# Patient Record
Sex: FEMALE | Race: WHITE | URBAN - METROPOLITAN AREA
[De-identification: names, ages, dates, MRNs, and addresses within clinical notes are randomized per-mention and may not be internally consistent; named-entity substitution may affect disease eponyms.]

---

## 2017-08-15 ENCOUNTER — HISTORICAL (OUTPATIENT)
Dept: LAB | Facility: HOSPITAL | Age: 37
End: 2017-08-15

## 2017-08-15 ENCOUNTER — HISTORICAL (OUTPATIENT)
Dept: PREADMISSION TESTING | Facility: HOSPITAL | Age: 37
End: 2017-08-15

## 2017-08-15 LAB
ABS NEUT (OLG): 3.61 X10(3)/MCL (ref 2.1–9.2)
ALBUMIN SERPL-MCNC: 4 GM/DL (ref 3.4–5)
ALBUMIN/GLOB SERPL: 1.1 RATIO (ref 1.1–2)
ALP SERPL-CCNC: 122 UNIT/L (ref 38–126)
ALT SERPL-CCNC: 29 UNIT/L (ref 12–78)
APTT PPP: 31.1 SECOND(S) (ref 20.6–36)
AST SERPL-CCNC: 17 UNIT/L (ref 15–37)
BASOPHILS # BLD AUTO: 0 X10(3)/MCL (ref 0–0.2)
BASOPHILS NFR BLD AUTO: 1 %
BILIRUB SERPL-MCNC: 0.5 MG/DL (ref 0.2–1)
BILIRUBIN DIRECT+TOT PNL SERPL-MCNC: 0.1 MG/DL (ref 0–0.5)
BILIRUBIN DIRECT+TOT PNL SERPL-MCNC: 0.4 MG/DL (ref 0–0.8)
BUN SERPL-MCNC: 11 MG/DL (ref 7–18)
CALCIUM SERPL-MCNC: 9.3 MG/DL (ref 8.5–10.1)
CHLORIDE SERPL-SCNC: 104 MMOL/L (ref 98–107)
CO2 SERPL-SCNC: 29 MMOL/L (ref 21–32)
CREAT SERPL-MCNC: 0.73 MG/DL (ref 0.55–1.02)
EOSINOPHIL # BLD AUTO: 0.1 X10(3)/MCL (ref 0–0.9)
EOSINOPHIL NFR BLD AUTO: 2 %
ERYTHROCYTE [DISTWIDTH] IN BLOOD BY AUTOMATED COUNT: 13.2 % (ref 11.5–17)
GLOBULIN SER-MCNC: 3.6 GM/DL (ref 2.4–3.5)
GLUCOSE SERPL-MCNC: 93 MG/DL (ref 74–106)
HCT VFR BLD AUTO: 46 % (ref 37–47)
HGB BLD-MCNC: 14.4 GM/DL (ref 12–16)
INR PPP: 1.04 (ref 0–1.27)
LYMPHOCYTES # BLD AUTO: 2.9 X10(3)/MCL (ref 0.6–4.6)
LYMPHOCYTES NFR BLD AUTO: 40 %
MCH RBC QN AUTO: 27.3 PG (ref 27–31)
MCHC RBC AUTO-ENTMCNC: 31.3 GM/DL (ref 33–36)
MCV RBC AUTO: 87.1 FL (ref 80–94)
MONOCYTES # BLD AUTO: 0.6 X10(3)/MCL (ref 0.1–1.3)
MONOCYTES NFR BLD AUTO: 8 %
NEUTROPHILS # BLD AUTO: 3.61 X10(3)/MCL (ref 2.1–9.2)
NEUTROPHILS NFR BLD AUTO: 50 %
PLATELET # BLD AUTO: 240 X10(3)/MCL (ref 130–400)
PMV BLD AUTO: 11.2 FL (ref 9.4–12.4)
POTASSIUM SERPL-SCNC: 4.6 MMOL/L (ref 3.5–5.1)
PROT SERPL-MCNC: 7.6 GM/DL (ref 6.4–8.2)
PROTHROMBIN TIME: 13.4 SECOND(S) (ref 12.1–14.2)
RBC # BLD AUTO: 5.28 X10(6)/MCL (ref 4.2–5.4)
SODIUM SERPL-SCNC: 141 MMOL/L (ref 136–145)
WBC # SPEC AUTO: 7.2 X10(3)/MCL (ref 4.5–11.5)

## 2017-08-18 ENCOUNTER — HISTORICAL (OUTPATIENT)
Dept: SURGERY | Facility: HOSPITAL | Age: 37
End: 2017-08-18

## 2018-06-15 ENCOUNTER — HISTORICAL (OUTPATIENT)
Dept: LAB | Facility: HOSPITAL | Age: 38
End: 2018-06-15

## 2018-06-15 ENCOUNTER — HISTORICAL (OUTPATIENT)
Dept: BARIATRICS | Facility: HOSPITAL | Age: 38
End: 2018-06-15

## 2018-06-15 ENCOUNTER — HISTORICAL (OUTPATIENT)
Dept: PREADMISSION TESTING | Facility: HOSPITAL | Age: 38
End: 2018-06-15

## 2018-06-15 LAB — H PYLORI AB SER IA-ACNC: NEGATIVE

## 2018-07-06 ENCOUNTER — HISTORICAL (OUTPATIENT)
Dept: SURGERY | Facility: HOSPITAL | Age: 38
End: 2018-07-06

## 2018-08-14 ENCOUNTER — HISTORICAL (OUTPATIENT)
Dept: BARIATRICS | Facility: HOSPITAL | Age: 38
End: 2018-08-14

## 2018-08-24 ENCOUNTER — HISTORICAL (OUTPATIENT)
Dept: BARIATRICS | Facility: HOSPITAL | Age: 38
End: 2018-08-24

## 2018-09-21 ENCOUNTER — HISTORICAL (OUTPATIENT)
Dept: BARIATRICS | Facility: HOSPITAL | Age: 38
End: 2018-09-21

## 2018-11-05 ENCOUNTER — HISTORICAL (OUTPATIENT)
Dept: BARIATRICS | Facility: HOSPITAL | Age: 38
End: 2018-11-05

## 2019-01-10 ENCOUNTER — HISTORICAL (OUTPATIENT)
Dept: BARIATRICS | Facility: HOSPITAL | Age: 39
End: 2019-01-10

## 2021-06-18 ENCOUNTER — HISTORICAL (OUTPATIENT)
Dept: ADMINISTRATIVE | Facility: HOSPITAL | Age: 41
End: 2021-06-18

## 2021-12-03 ENCOUNTER — HISTORICAL (OUTPATIENT)
Dept: SURGERY | Facility: HOSPITAL | Age: 41
End: 2021-12-03

## 2021-12-03 ENCOUNTER — HOSPITAL ENCOUNTER (OUTPATIENT)
Dept: SURGERY | Facility: HOSPITAL | Age: 41
End: 2021-12-04
Attending: SURGERY | Admitting: SURGERY

## 2022-04-30 NOTE — OP NOTE
DATE OF SURGERY:    08/18/2017    SURGEON:  Doe Mcdonough MD    PREOPERATIVE DIAGNOSIS:  Obstructive sleep apnea, tonsillar hypertrophy.    POSTOPERATIVE DIAGNOSIS:  Obstructive sleep apnea, tonsillar hypertrophy.    PROCEDURE:  Tonsillectomy.    BRIEF HISTORY:  This is a 37-year-old female with a history of severe obstructive sleep apnea and tonsillar hypertrophy.  Through discussion were had with Liz regarding tonsillectomy with uvulopalatopharyngoplasty, however she wished not to do the uvulopalatopharyngoplasty and wanted to only proceed with tonsillectomy.  We had thorough discussions about the risks, benefits and alternatives including bleeding, infection, dehydration, pain, return to surgery and others.  The patient wished to proceed.  Due to her BMI  it was felt best to do this where she could possibly be kept for observation if need be.  After a discussion with the patient regarding these factors and others, consents were signed.    PROCEDURE IN DETAIL:  The patient was brought to the operating room and placed supine position.  After achieving general endotracheal anesthesia, a shoulder roll was placed between the scapula to aid in extension of the neck. The patient was draped for tonsillectomy.  With the use of Dean Yolanda mouth gag, the oropharynx was exposed.  There was very little room in the oral cavity and oral pharynx and with a large tongue present as well, it was decided that we should sweep the tongue to one side, re-suspend and address the surgery this way to get a better view of the oral pharynx.  With use of red rubber catheter, it was placed through the nose and suspended with hemostat tacked to a soft palate retractor.  There was some difficulty in getting the tooth guards of the Dean-Yolanda mouth gag to attach to her teeth completely, however they were protected by the 4 x 4, the gums were protected by a 4 x 4, which was moistened.  Using an Allis clamp the right tonsil was  grasped, it appeared to be significantly larger than the right and extended higher into the soft palate.  With the use of the PEAK blade, the tonsil was snared as well as the suction cautery.  The tonsil was dissected from the fossa with care being taken to stay in the correct plane.  The tonsil was taken out from superior pole down to the inferior pole.  Hemostasis was achieved with suction cautery.  Once this tonsil was dry, we then released all instrumentations, swept the tongue to the opposite side and addressed the left tonsil using the same technique.  After completion of the tonsillectomy, the red rubber catheter was released.  Valsalva maneuver maneuvers were done which showed no evidence of bleeding.  Copious irrigation was done as well.  Once the patient was dry, instrumentation was then     withdrawn and the patient awakened and extubated in the operating room and brought to recovery in stable condition.        ______________________________  MD ARVIND Ruiz/YOSI  DD:  08/21/2017  Time:  10:56AM  DT:  08/21/2017  Time:  12:45PM  Job #:  [id number]

## 2022-04-30 NOTE — OP NOTE
Patient:   Liz Mcfarladn            MRN: [id number]            FIN: [id number]               Age:   38 years     Sex:  Female     :  1980   Associated Diagnoses:   GERD - Gastro-esophageal reflux disease; Morbid obesity   Author:   Claudio Dawn MD      Operative Note   Operative Information   Date/ Time:  2018 07:08:00.     Procedures Performed: esophagogastroduodenoscopy.     Indications: 38f is undergoing preoperative workup for planned lap vertical sleeve gastrectomy for morbid obesity  reports  GERD symptoms, need to rule out hiatal hernia or ulcer.     Preoperative Diagnosis: GERD - Gastro-esophageal reflux disease (XFG82-CN K21.9), Morbid obesity (FXP76-CH E66.01).     Postoperative Diagnosis: GERD - Gastro-esophageal reflux disease (ULC40-WG K21.9), Morbid obesity (HYS99-ZE E66.01).     Surgeon: Claudio Dawn MD.     Anesthesia: mac.     Speciman Removed: none.     Description of Procedure/Findings/    Complications: pt laid in left lateral decubitus position and monitored sedation administered by anesthesia    enteroscope advance into patients mouth through bite block    esohagus normal appearing,    stomach appeared normal, scope then advaced in duodenum, duodenum normal appearing mucosa,    the stomach was inflated and retroflexed view revealed no hiatal hernia, hiatus normal,    the air was suctioned out and the scope was withdrawn    pt taken to pacu in stable satisfactory condition..     Esimated blood loss: No blood loss.     Findings: normal mucosa  no hiatal hernia.     Complications: None.

## 2022-05-04 NOTE — HISTORICAL [facility] CERNER
This is a historical note converted from Shelly. Formatting and pictures may have been removed.  Please reference Shelly for original formatting and attached multimedia. Indication for Surgery  This is a 41-year-old female that had a massive weight loss.??She presented to me for panniculectomy and brachioplasty evaluation.??Options were discussed with her and she elected to proceed?with bilateral brachioplasty with a lateral thoracoplasty?using an L-shaped incision?and a doljt-jt-tyz panniculectomy.??The risks of surgery were discussed in detail including wound healing complications tissue necrosis?nerve injury, lymphatic damage,?scarring?and loss of function.??DVT and PE were also discussed in detail.??She signed the informed consent  Preoperative Diagnosis  Large pannus, unsatisfactory appearance of the arms and lateral thorax  Postoperative Diagnosis  Same  Operation  1.??Rmfon-kb-fcu panniculectomy  2.??Bilateral brachioplasty with lateral thoracoplasty  Surgeon(s)  Ceasar Patel  Assistant  None  Anesthesia  General  Complications  None  Technique  Patient was identified in the preoperative holding area.?   Informed consent was reviewed with her. She was marked for bilateral?L-shaped brachioplastys and lateral thoracoplasty?and a ibvov-jd-fzc panniculectomy. She was seen in the operating room placed supine position general tracheal anesthesia was provided. Prepped and draped in the?standard surgical fashion. Timeout was taken and was agreement.  ?  ?  She started with the patients right arm.?Her markings were confirmed. Skin was incised with 10 blade scalpel. Skin and subcutaneous tissue was removed.?Hemostasis was ensured with electrocautery.?A 15 round Alan drain was placed within the wound that exited inferiorly secured to skin with 3-0 silk.?Skin flap was anchored?at the dome of the axilla to the?deltopectoral fascia using 0 Vicryl suture. Skin and subcutaneous tissues tissue was approximately 0 Vicryl  sutures and deep dermis.?2 a subcuticular?stratafix the skin. Dermabond tape was used to dressing.  ?  Attention was then paid to the patients left arm.?Skin markings were incised with 10 blade scalpel. A large cutter was used to remove the skin and subcutaneous tissue.?Skin flap was anchored to the deltopectoral abdomen axilla with 0 Vicryl sutures.?Deep dermis of the cc tissue is present with 0 Vicryl sutures in interrupted fashion. Subcutaneous ablatorand the wound bed prior to closure secured to skin with 3-0 silk suture inferiorly.? Skin was closed with 2-0 stratafix  ?  ?  We then performed a txalb-nx-xcy panniculectomy.?Markings are confirmed. Skin was incised a 10 blade scalpel further dissection was achieved with electrocautery.?The umbilicus is maintained on its stalk.?We removed the entire skin and subcutaneous tissue above the abdominal wall fascia.?The wound was then closed?in layers.?2, 15 round Alan drains were placed prior to closure exited laterally scheduled skin 3-0 silk.? Superficial fascial system and deep dermis were closed with?0 Vicryl sutures in interrupted fashion.?Umbilicus was also inset in the same fashion.?2 subtalar strategies was then used on the skin?area and Dermabond tape was used to dressing.  ?  All counts correct.? No complications

## 2024-10-11 ENCOUNTER — HOSPITAL ENCOUNTER (EMERGENCY)
Facility: HOSPITAL | Age: 44
Discharge: HOME | End: 2024-10-11
Attending: EMERGENCY MEDICINE
Payer: COMMERCIAL

## 2024-10-11 VITALS
SYSTOLIC BLOOD PRESSURE: 127 MMHG | TEMPERATURE: 98 F | RESPIRATION RATE: 18 BRPM | WEIGHT: 130 LBS | OXYGEN SATURATION: 97 % | DIASTOLIC BLOOD PRESSURE: 74 MMHG | HEART RATE: 86 BPM

## 2024-10-11 DIAGNOSIS — N39.0 URINARY TRACT INFECTION WITHOUT HEMATURIA, SITE UNSPECIFIED: ICD-10-CM

## 2024-10-11 DIAGNOSIS — F41.9 ANXIETY: Primary | ICD-10-CM

## 2024-10-11 LAB
ALBUMIN SERPL BCP-MCNC: 4.7 G/DL (ref 3.5–5.2)
ALP SERPL-CCNC: 78 U/L (ref 55–135)
ALT SERPL W/O P-5'-P-CCNC: 9 U/L (ref 10–44)
ANION GAP SERPL CALC-SCNC: 17 MMOL/L (ref 8–16)
AST SERPL-CCNC: 14 U/L (ref 10–40)
B-HCG UR QL: NEGATIVE
BACTERIA #/AREA URNS HPF: ABNORMAL /HPF
BASOPHILS # BLD AUTO: 0.06 K/UL (ref 0–0.2)
BASOPHILS NFR BLD: 0.6 % (ref 0–1.9)
BILIRUB SERPL-MCNC: 0.8 MG/DL (ref 0.1–1)
BILIRUB UR QL STRIP: NEGATIVE
BUN SERPL-MCNC: 11 MG/DL (ref 6–20)
CALCIUM SERPL-MCNC: 10.4 MG/DL (ref 8.7–10.5)
CHLORIDE SERPL-SCNC: 107 MMOL/L (ref 95–110)
CLARITY UR: ABNORMAL
CO2 SERPL-SCNC: 23 MMOL/L (ref 23–29)
COLOR UR: YELLOW
CREAT SERPL-MCNC: 1.4 MG/DL (ref 0.5–1.4)
DIFFERENTIAL METHOD BLD: NORMAL
EOSINOPHIL # BLD AUTO: 0.2 K/UL (ref 0–0.5)
EOSINOPHIL NFR BLD: 1.4 % (ref 0–8)
ERYTHROCYTE [DISTWIDTH] IN BLOOD BY AUTOMATED COUNT: 12.9 % (ref 11.5–14.5)
EST. GFR  (NO RACE VARIABLE): 47 ML/MIN/1.73 M^2
GLUCOSE SERPL-MCNC: 99 MG/DL (ref 70–110)
GLUCOSE UR QL STRIP: NEGATIVE
GRAN CASTS #/AREA URNS LPF: 10 /LPF
HCT VFR BLD AUTO: 43.8 % (ref 37–48.5)
HGB BLD-MCNC: 14.6 G/DL (ref 12–16)
HGB UR QL STRIP: NEGATIVE
HYALINE CASTS #/AREA URNS LPF: 18 /LPF
IMM GRANULOCYTES # BLD AUTO: 0.03 K/UL (ref 0–0.04)
IMM GRANULOCYTES NFR BLD AUTO: 0.3 % (ref 0–0.5)
KETONES UR QL STRIP: ABNORMAL
LEUKOCYTE ESTERASE UR QL STRIP: NEGATIVE
LYMPHOCYTES # BLD AUTO: 2.8 K/UL (ref 1–4.8)
LYMPHOCYTES NFR BLD: 25.8 % (ref 18–48)
MCH RBC QN AUTO: 29.1 PG (ref 27–31)
MCHC RBC AUTO-ENTMCNC: 33.3 G/DL (ref 32–36)
MCV RBC AUTO: 87 FL (ref 82–98)
MICROSCOPIC COMMENT: ABNORMAL
MONOCYTES # BLD AUTO: 0.8 K/UL (ref 0.3–1)
MONOCYTES NFR BLD: 7.6 % (ref 4–15)
NEUTROPHILS # BLD AUTO: 7 K/UL (ref 1.8–7.7)
NEUTROPHILS NFR BLD: 64.3 % (ref 38–73)
NITRITE UR QL STRIP: POSITIVE
NRBC BLD-RTO: 0 /100 WBC
PH UR STRIP: 6 [PH] (ref 5–8)
PLATELET # BLD AUTO: 304 K/UL (ref 150–450)
PMV BLD AUTO: 10.9 FL (ref 9.2–12.9)
POTASSIUM SERPL-SCNC: 3.2 MMOL/L (ref 3.5–5.1)
PROT SERPL-MCNC: 8 G/DL (ref 6–8.4)
PROT UR QL STRIP: ABNORMAL
RBC # BLD AUTO: 5.02 M/UL (ref 4–5.4)
SODIUM SERPL-SCNC: 147 MMOL/L (ref 136–145)
SP GR UR STRIP: 1.02 (ref 1–1.03)
SQUAMOUS #/AREA URNS HPF: 2 /HPF
URN SPEC COLLECT METH UR: ABNORMAL
UROBILINOGEN UR STRIP-ACNC: NEGATIVE EU/DL
WBC # BLD AUTO: 10.86 K/UL (ref 3.9–12.7)
WBC #/AREA URNS HPF: 19 /HPF (ref 0–5)
WBC CLUMPS URNS QL MICRO: ABNORMAL

## 2024-10-11 PROCEDURE — 87088 URINE BACTERIA CULTURE: CPT | Performed by: NURSE PRACTITIONER

## 2024-10-11 PROCEDURE — 63600175 PHARM REV CODE 636 W HCPCS: Performed by: NURSE PRACTITIONER

## 2024-10-11 PROCEDURE — 96374 THER/PROPH/DIAG INJ IV PUSH: CPT

## 2024-10-11 PROCEDURE — 99285 EMERGENCY DEPT VISIT HI MDM: CPT | Mod: 25

## 2024-10-11 PROCEDURE — 87186 SC STD MICRODIL/AGAR DIL: CPT | Performed by: NURSE PRACTITIONER

## 2024-10-11 PROCEDURE — 25000003 PHARM REV CODE 250: Performed by: NURSE PRACTITIONER

## 2024-10-11 PROCEDURE — 81000 URINALYSIS NONAUTO W/SCOPE: CPT | Performed by: NURSE PRACTITIONER

## 2024-10-11 PROCEDURE — 85025 COMPLETE CBC W/AUTO DIFF WBC: CPT | Performed by: NURSE PRACTITIONER

## 2024-10-11 PROCEDURE — 87086 URINE CULTURE/COLONY COUNT: CPT | Performed by: NURSE PRACTITIONER

## 2024-10-11 PROCEDURE — 80053 COMPREHEN METABOLIC PANEL: CPT | Performed by: NURSE PRACTITIONER

## 2024-10-11 PROCEDURE — 81025 URINE PREGNANCY TEST: CPT | Performed by: NURSE PRACTITIONER

## 2024-10-11 PROCEDURE — 96375 TX/PRO/DX INJ NEW DRUG ADDON: CPT

## 2024-10-11 PROCEDURE — 96361 HYDRATE IV INFUSION ADD-ON: CPT

## 2024-10-11 RX ORDER — CEPHALEXIN 500 MG/1
500 CAPSULE ORAL 4 TIMES DAILY
Qty: 28 CAPSULE | Refills: 0 | Status: SHIPPED | OUTPATIENT
Start: 2024-10-11 | End: 2024-10-18

## 2024-10-11 RX ORDER — KETOROLAC TROMETHAMINE 30 MG/ML
15 INJECTION, SOLUTION INTRAMUSCULAR; INTRAVENOUS
Status: COMPLETED | OUTPATIENT
Start: 2024-10-11 | End: 2024-10-11

## 2024-10-11 RX ORDER — ONDANSETRON HYDROCHLORIDE 2 MG/ML
4 INJECTION, SOLUTION INTRAVENOUS
Status: DISCONTINUED | OUTPATIENT
Start: 2024-10-11 | End: 2024-10-11

## 2024-10-11 RX ORDER — LORAZEPAM 2 MG/ML
1 INJECTION INTRAMUSCULAR
Status: COMPLETED | OUTPATIENT
Start: 2024-10-11 | End: 2024-10-11

## 2024-10-11 RX ADMIN — SODIUM CHLORIDE 1000 ML: 0.9 INJECTION, SOLUTION INTRAVENOUS at 08:10

## 2024-10-11 RX ADMIN — LORAZEPAM 1 MG: 2 INJECTION INTRAMUSCULAR; INTRAVENOUS at 07:10

## 2024-10-11 RX ADMIN — KETOROLAC TROMETHAMINE 15 MG: 30 INJECTION, SOLUTION INTRAMUSCULAR at 07:10

## 2024-10-12 NOTE — FIRST PROVIDER EVALUATION
Medical screening examination initiated.  I have conducted a focused provider triage encounter, findings are as follows:    Brief history of present illness:  Patient reports past medical history of narcolepsy along anxiety.  Patient reports worsening headache and body aches.  Patient reports she began shaking and became very nauseated earlier prior to arrival.  Patient reports worsening headache and is now stuttering.    Vitals:    10/11/24 1819   BP: 123/87   Pulse: (!) 112   Resp: (!) 22   Temp: 98 °F (36.7 °C)   TempSrc: Oral   SpO2: 96%   Weight: 59 kg (130 lb)       Pertinent physical exam:  No acute distress    Brief workup plan:  Labs, meds, imaging, further eval    Preliminary workup initiated; this workup will be continued and followed by the physician or advanced practice provider that is assigned to the patient when roomed.

## 2024-10-12 NOTE — ED PROVIDER NOTES
Encounter Date: 10/11/2024       History     Chief Complaint   Patient presents with    Panic Attack     Pt arrived via EMS after having a panic attack at [facility].  Pt reported that initially she confused her anxiety with difficulty breathing and took 4 puffs of  her albuterol inhaler. States that she has clonazepam but does not take it when driving. Denies cp, sob at this time      44-year-old female presents emergency department for generalized body aches and anxiety along with and increasing headache.  Patient reports when her anxiety flares up she does began stuttering.  Patient denies any fever, chills, chest pain, shortness of breath, back pain, abdominal pain, nausea, vomiting, and all other concerns at this time.    The history is provided by the patient. No  was used.     Review of patient's allergies indicates:   Allergen Reactions    Betadine surgi-prep Rash     History reviewed. No pertinent past medical history.  History reviewed. No pertinent surgical history.  No family history on file.  Social History     Tobacco Use    Smoking status: Never    Smokeless tobacco: Never     Review of Systems   Constitutional:  Negative for fever.   HENT:  Negative for sore throat.    Respiratory:  Negative for shortness of breath.    Cardiovascular:  Negative for chest pain.   Gastrointestinal:  Negative for abdominal pain, nausea and vomiting.   Genitourinary:  Negative for dysuria.   Musculoskeletal:  Negative for back pain.   Skin:  Negative for rash.   Neurological:  Positive for headaches. Negative for weakness.   Hematological:  Does not bruise/bleed easily.   Psychiatric/Behavioral:  The patient is nervous/anxious.        Physical Exam     Initial Vitals [10/11/24 1819]   BP Pulse Resp Temp SpO2   123/87 (!) 112 (!) 22 98 °F (36.7 °C) 96 %      MAP       --         Physical Exam    Nursing note and vitals reviewed.  Constitutional: She appears well-developed and well-nourished. She is not  diaphoretic. No distress.   HENT:   Head: Normocephalic and atraumatic.   Eyes: Right eye exhibits no discharge. Left eye exhibits no discharge.   Neck: Neck supple.   Normal range of motion.  Cardiovascular:            Tachycardic   Pulmonary/Chest: No respiratory distress.   Abdominal: She exhibits no distension.   Musculoskeletal:         General: Normal range of motion.      Cervical back: Normal range of motion and neck supple.     Neurological: She is alert and oriented to person, place, and time. She has normal strength.   Stuttering speech   Skin: Skin is warm and dry.   Psychiatric: She has a normal mood and affect. Her behavior is normal. Thought content normal.         ED Course   Procedures  Labs Reviewed   COMPREHENSIVE METABOLIC PANEL - Abnormal       Result Value    Sodium 147 (*)     Potassium 3.2 (*)     Chloride 107      CO2 23      Glucose 99      BUN 11      Creatinine 1.4      Calcium 10.4      Total Protein 8.0      Albumin 4.7      Total Bilirubin 0.8      Alkaline Phosphatase 78      AST 14      ALT 9 (*)     eGFR 47 (*)     Anion Gap 17 (*)    URINALYSIS, REFLEX TO URINE CULTURE - Abnormal    Specimen UA Urine, Clean Catch      Color, UA Yellow      Appearance, UA Hazy (*)     pH, UA 6.0      Specific Gravity, UA 1.020      Protein, UA Trace (*)     Glucose, UA Negative      Ketones, UA 2+ (*)     Bilirubin (UA) Negative      Occult Blood UA Negative      Nitrite, UA Positive (*)     Urobilinogen, UA Negative      Leukocytes, UA Negative      Narrative:     Specimen Source->Urine   URINALYSIS MICROSCOPIC - Abnormal    WBC, UA 19 (*)     WBC Clumps, UA Many (*)     Bacteria Occasional      Squam Epithel, UA 2      Hyaline Casts, UA 18 (*)     Granular Casts, UA 10 (*)     Microscopic Comment SEE COMMENT      Narrative:     Specimen Source->Urine   CULTURE, URINE   CBC W/ AUTO DIFFERENTIAL    WBC 10.86      RBC 5.02      Hemoglobin 14.6      Hematocrit 43.8      MCV 87      MCH 29.1      MCHC  33.3      RDW 12.9      Platelets 304      MPV 10.9      Immature Granulocytes 0.3      Gran # (ANC) 7.0      Immature Grans (Abs) 0.03      Lymph # 2.8      Mono # 0.8      Eos # 0.2      Baso # 0.06      nRBC 0      Gran % 64.3      Lymph % 25.8      Mono % 7.6      Eosinophil % 1.4      Basophil % 0.6      Differential Method Automated     PREGNANCY TEST, URINE RAPID    Preg Test, Ur Negative      Narrative:     Specimen Source->Urine          Imaging Results              CT Head Without Contrast (Final result)  Result time 10/11/24 20:16:22      Final result by Leobardo Goetz MD (10/11/24 20:16:22)                   Impression:      No acute abnormality.    All CT scans   are performed using dose optimization techniques including the following: automated exposure control; adjustment of the mA and/or kV; use of iterative reconstruction technique.  Dose modulation was employed for ALARA by means of: Automated exposure control; adjustment of the mA and/or kV according to patient size (this includes techniques or standardized protocols for targeted exams where dose is matched to indication/reason for exam; i.e. extremities or head); and/or use of iterative reconstructive technique.      Electronically signed by: Leobardo Goetz  Date:    10/11/2024  Time:    20:16               Narrative:    EXAMINATION:  CT HEAD WITHOUT CONTRAST    CLINICAL HISTORY:  Headache, new or worsening, neuro deficit (Age 19-49y);    TECHNIQUE:  Low dose axial CT images obtained throughout the head without intravenous contrast. Sagittal and coronal reconstructions were performed.    COMPARISON:  None.    FINDINGS:  Intracranial compartment:    Ventricles and sulci are normal in size for age without evidence of hydrocephalus. No extra-axial blood or fluid collections.    No parenchymal mass, hemorrhage, edema or major vascular distribution infarct.    Skull/extracranial contents (limited evaluation): No fracture. Mastoid air cells and  paranasal sinuses are essentially clear.                                       Medications   LORazepam injection 1 mg (1 mg Intravenous Given 10/11/24 1933)   ketorolac injection 15 mg (15 mg Intravenous Given 10/11/24 1931)   sodium chloride 0.9% bolus 1,000 mL 1,000 mL (0 mLs Intravenous Stopped 10/11/24 2145)     Medical Decision Making              Patient reports symptoms have improved after medication in the ER.                      Clinical Impression:  Final diagnoses:  [F41.9] Anxiety (Primary)  [N39.0] Urinary tract infection without hematuria, site unspecified          ED Disposition Condition    Discharge Stable          ED Prescriptions       Medication Sig Dispense Start Date End Date Auth. Provider    cephALEXin (KEFLEX) 500 MG capsule Take 1 capsule (500 mg total) by mouth 4 (four) times daily. for 7 days 28 capsule 10/11/2024 10/18/2024 Neri Badillo NP          Follow-up Information       Follow up With Specialties Details Why Contact Info    pcp of choice   As needed     [facility] - Emergency Dept. Emergency Medicine  If symptoms worsen [id number] [facility] [id number] [phone]             Neri Badillo, NP  10/12/24 1507

## 2024-10-14 LAB — BACTERIA UR CULT: ABNORMAL
